# Patient Record
Sex: MALE | Race: WHITE | Employment: OTHER | URBAN - NONMETROPOLITAN AREA
[De-identification: names, ages, dates, MRNs, and addresses within clinical notes are randomized per-mention and may not be internally consistent; named-entity substitution may affect disease eponyms.]

---

## 2024-02-20 ENCOUNTER — HOSPITAL ENCOUNTER (OUTPATIENT)
Dept: INFUSION THERAPY | Age: 69
Discharge: HOME | End: 2024-02-20
Payer: MEDICARE

## 2024-02-20 ENCOUNTER — OFFICE VISIT (OUTPATIENT)
Dept: ONCOLOGY | Age: 69
End: 2024-02-20
Payer: MEDICARE

## 2024-02-20 VITALS
OXYGEN SATURATION: 98 % | DIASTOLIC BLOOD PRESSURE: 83 MMHG | HEART RATE: 87 BPM | RESPIRATION RATE: 16 BRPM | TEMPERATURE: 98 F | SYSTOLIC BLOOD PRESSURE: 154 MMHG

## 2024-02-20 VITALS
DIASTOLIC BLOOD PRESSURE: 83 MMHG | OXYGEN SATURATION: 98 % | WEIGHT: 227.6 LBS | BODY MASS INDEX: 30.17 KG/M2 | TEMPERATURE: 98 F | RESPIRATION RATE: 16 BRPM | SYSTOLIC BLOOD PRESSURE: 154 MMHG | HEART RATE: 87 BPM | HEIGHT: 73 IN

## 2024-02-20 DIAGNOSIS — D47.2 MGUS (MONOCLONAL GAMMOPATHY OF UNKNOWN SIGNIFICANCE): ICD-10-CM

## 2024-02-20 DIAGNOSIS — D47.2 MGUS (MONOCLONAL GAMMOPATHY OF UNKNOWN SIGNIFICANCE): Primary | ICD-10-CM

## 2024-02-20 DIAGNOSIS — E83.52 HYPERCALCEMIA: ICD-10-CM

## 2024-02-20 LAB
ALBUMIN SERPL BCG-MCNC: 4.5 G/DL (ref 3.5–5.1)
ALP SERPL-CCNC: 124 U/L (ref 38–126)
ALT SERPL W/O P-5'-P-CCNC: 15 U/L (ref 11–66)
AST SERPL-CCNC: 21 U/L (ref 5–40)
BASOPHILS # BLD AUTO: 0 THOU/MM3 (ref 0–0.1)
BASOPHILS NFR BLD AUTO: 0 % (ref 0–3)
BILIRUB CONJ SERPL-MCNC: < 0.2 MG/DL (ref 0–0.3)
BILIRUB SERPL-MCNC: 0.4 MG/DL (ref 0.3–1.2)
BUN BLDP-MCNC: 10 MG/DL (ref 8–26)
CHLORIDE BLD-SCNC: 111 MEQ/L (ref 98–109)
CREAT BLD-MCNC: 1.1 MG/DL (ref 0.5–1.2)
EOSINOPHIL # BLD AUTO: 0 THOU/MM3 (ref 0–0.4)
EOSINOPHIL NFR BLD AUTO: 0 % (ref 0–4)
ERYTHROCYTE [DISTWIDTH] IN BLOOD BY AUTOMATED COUNT: 15.1 % (ref 11.5–14.5)
GFR SERPL CREATININE-BSD FRML MDRD: > 60 ML/MIN/1.73M2
GLUCOSE BLD-MCNC: 99 MG/DL (ref 70–108)
HCT VFR BLD AUTO: 41.7 % (ref 42–52)
HGB BLD-MCNC: 13.8 GM/DL (ref 14–18)
IMM GRANULOCYTES # BLD AUTO: 0 THOU/MM3 (ref 0–0.07)
IMM GRANULOCYTES NFR BLD AUTO: 0 %
IONIZED CALCIUM, WHOLE BLOOD: 1.41 MMOL/L (ref 1.12–1.32)
LYMPHOCYTES # BLD AUTO: 1 THOU/MM3 (ref 1–4.8)
LYMPHOCYTES NFR BLD AUTO: 22 % (ref 15–47)
MCH RBC QN AUTO: 28.8 PG (ref 26–33)
MCHC RBC AUTO-ENTMCNC: 33.1 GM/DL (ref 32.2–35.5)
MCV RBC AUTO: 87 FL (ref 80–94)
MONOCYTES # BLD AUTO: 0.3 THOU/MM3 (ref 0.4–1.3)
MONOCYTES NFR BLD AUTO: 7 % (ref 0–12)
NEUTROPHILS NFR BLD AUTO: 71 % (ref 43–75)
PLATELET # BLD AUTO: 149 THOU/MM3 (ref 130–400)
PMV BLD AUTO: 8.5 FL (ref 9.4–12.4)
POTASSIUM BLD-SCNC: 4.2 MEQ/L (ref 3.5–4.9)
PROT SERPL-MCNC: 7 G/DL (ref 6.1–8)
PTH-INTACT SERPL-MCNC: 96.2 PG/ML (ref 15–65)
RBC # BLD AUTO: 4.8 MILL/MM3 (ref 4.7–6.1)
SEGMENTED NEUTROPHILS ABSOLUTE COUNT: 3.2 THOU/MM3 (ref 1.8–7.7)
SODIUM BLD-SCNC: 142 MEQ/L (ref 138–146)
TOTAL CO2, WHOLE BLOOD: 26 MEQ/L (ref 23–33)
WBC # BLD AUTO: 4.5 THOU/MM3 (ref 4.8–10.8)

## 2024-02-20 PROCEDURE — 84165 PROTEIN E-PHORESIS SERUM: CPT

## 2024-02-20 PROCEDURE — 80076 HEPATIC FUNCTION PANEL: CPT

## 2024-02-20 PROCEDURE — G8484 FLU IMMUNIZE NO ADMIN: HCPCS | Performed by: NURSE PRACTITIONER

## 2024-02-20 PROCEDURE — 82784 ASSAY IGA/IGD/IGG/IGM EACH: CPT

## 2024-02-20 PROCEDURE — 83521 IG LIGHT CHAINS FREE EACH: CPT

## 2024-02-20 PROCEDURE — 1123F ACP DISCUSS/DSCN MKR DOCD: CPT | Performed by: NURSE PRACTITIONER

## 2024-02-20 PROCEDURE — 3017F COLORECTAL CA SCREEN DOC REV: CPT | Performed by: NURSE PRACTITIONER

## 2024-02-20 PROCEDURE — 85025 COMPLETE CBC W/AUTO DIFF WBC: CPT

## 2024-02-20 PROCEDURE — 86334 IMMUNOFIX E-PHORESIS SERUM: CPT

## 2024-02-20 PROCEDURE — 80047 BASIC METABLC PNL IONIZED CA: CPT

## 2024-02-20 PROCEDURE — G8417 CALC BMI ABV UP PARAM F/U: HCPCS | Performed by: NURSE PRACTITIONER

## 2024-02-20 PROCEDURE — 83970 ASSAY OF PARATHORMONE: CPT

## 2024-02-20 PROCEDURE — 4004F PT TOBACCO SCREEN RCVD TLK: CPT | Performed by: NURSE PRACTITIONER

## 2024-02-20 PROCEDURE — 99203 OFFICE O/P NEW LOW 30 MIN: CPT | Performed by: NURSE PRACTITIONER

## 2024-02-20 PROCEDURE — G8427 DOCREV CUR MEDS BY ELIG CLIN: HCPCS | Performed by: NURSE PRACTITIONER

## 2024-02-20 PROCEDURE — 99211 OFF/OP EST MAY X REQ PHY/QHP: CPT

## 2024-02-20 PROCEDURE — 84155 ASSAY OF PROTEIN SERUM: CPT

## 2024-02-20 RX ORDER — M-VIT,TX,IRON,MINS/CALC/FOLIC 27MG-0.4MG
1 TABLET ORAL DAILY
COMMUNITY

## 2024-02-20 RX ORDER — TRAZODONE HYDROCHLORIDE 50 MG/1
50 TABLET ORAL NIGHTLY
COMMUNITY
Start: 2023-12-14

## 2024-02-20 RX ORDER — AMLODIPINE BESYLATE 5 MG/1
5 TABLET ORAL DAILY
COMMUNITY
Start: 2023-08-24

## 2024-02-20 RX ORDER — BUSPIRONE HYDROCHLORIDE 10 MG/1
10 TABLET ORAL 2 TIMES DAILY
COMMUNITY
Start: 2024-01-26

## 2024-02-20 NOTE — PROGRESS NOTES
[facility] [place] [facility]  [facility]  [address]  [address]  [facility] [address]  Dept: [phone]  Loc: [phone]       Visit Date:2/20/2024     Erich Balbuena is a 68 y.o. male who presents today for:   Chief Complaint   Patient presents with    New Patient     MGUS        HPI:   Erich Balbuena is a 68 y.o. male referred to Hematology/Oncology clinic by Dr. Munoz for evaluation of MGUS.  He has a history of anxiety, depression, HTN, hyperlipidemia and GERD.  The patient also has a history of sleep apnea which resolved with weight loss.      01/19/2024 SPEP/VIVIENNE interpretation showed a normal SPEP pattern and a slight restriction in IgG lambda which could be artifact, specific immune response or an early monoclonal protein.  Vitamin B12 and folate levels within normal limits.  GFR 67, BUN 14 and creatinine 1.1.  Serum calcium level elevated at 11.  WBC 4.5, Hgb 15.1, HCT 46.7%, MCV 88.1, RDW 15.4% and platelet count 166,000.    02/06/2024 the patient underwent further evaluation with a MRI of the brain for mild cognitive impairment which showed no acute intracranial abnormality and mild chronic small vessel ischemic disease.    The patient denies any headaches or vision changes.  No chest pain, cough or SOB.  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  He denies any recent falls or trauma.  No exposure to any toxic or infectious agents.  He complains of chronic joint pain and muscle weakness.    GFR within normal limits.  Ionized calcium elevated at 1.41.  The patient is currently on treatment with a MVT containing 32 mg of calcium. He has a significant history of kidney stones and previously followed with Dr. Benavides at [facility], but he retired and the patient has not been seen for follow-up.  WBC 4.5, Hgb 13.8, HCT 41.7%, MCV 87, RDW 15.1% and platelet count 149,000.  Additional lab results are pending.  The

## 2024-02-21 LAB
IGA SERPL-MCNC: 134 MG/DL (ref 70–400)
IGG SERPL-MCNC: 797 MG/DL (ref 700–1600)
IGM SERPL-MCNC: 102 MG/DL (ref 40–230)

## 2024-02-23 LAB — KAPPA/LAMBDA FREE LIGHT CHAINS: NORMAL

## 2024-02-24 LAB — IMMUNOFIXATION WITH QUANT: NORMAL

## 2024-02-27 ENCOUNTER — CLINICAL DOCUMENTATION (OUTPATIENT)
Dept: ONCOLOGY | Age: 69
End: 2024-02-27

## 2024-02-27 DIAGNOSIS — D47.2 MGUS (MONOCLONAL GAMMOPATHY OF UNKNOWN SIGNIFICANCE): Primary | ICD-10-CM

## 2024-02-28 NOTE — PROGRESS NOTES
GFR and BMP results are within normal limits.  Ionized calcium remains elevated at 1.41.  Hepatic function panel results within normal limits.  WBC 4.5, Hgb 13.8, HCT 41.7%, MCV 87, RDW 15.1% and platelet count 149,000.  IgG, IgA and IgM levels within normal limits.  PTH level elevated at 96.2.  SPEP/VIVIENNE interpretation showed a normal SPEP pattern with a slight restriction at the application point in IgG lambda which could be an artifact, a specific immune response, or an early monoclonal protein.  Kappa free light chain slightly elevated at 21.86, lambda free light chain within normal limits at 17.37 and kappa/lambda free light chain ratio within normal limits at 1.26.    Labs were reviewed with the patient.  He was encouraged to establish with a new endocrinologist for continued follow-up on his hypercalcemia and hyperparathyroidism.  He verbalized an understanding and will call for an appointment.  The patient will be seen for a follow-up with repeat labs in our office in 3 months.

## 2024-03-07 DIAGNOSIS — E21.3 HYPERPARATHYROIDISM (HCC): Primary | ICD-10-CM

## 2024-05-30 ENCOUNTER — TELEPHONE (OUTPATIENT)
Dept: ONCOLOGY | Age: 69
End: 2024-05-30

## 2024-05-30 NOTE — TELEPHONE ENCOUNTER
Patient did not have his labs done. Gave him his labs to be done at [facility] and come back 6/13/24 per Elvie

## 2024-06-13 ENCOUNTER — TELEPHONE (OUTPATIENT)
Dept: ONCOLOGY | Age: 69
End: 2024-06-13

## 2024-08-19 ENCOUNTER — TELEPHONE (OUTPATIENT)
Dept: ONCOLOGY | Age: 69
End: 2024-08-19